# Patient Record
Sex: MALE | Race: OTHER | HISPANIC OR LATINO | URBAN - METROPOLITAN AREA
[De-identification: names, ages, dates, MRNs, and addresses within clinical notes are randomized per-mention and may not be internally consistent; named-entity substitution may affect disease eponyms.]

---

## 2023-08-18 ENCOUNTER — EMERGENCY (EMERGENCY)
Facility: HOSPITAL | Age: 58
LOS: 1 days | Discharge: HOME | End: 2023-08-18
Attending: EMERGENCY MEDICINE | Admitting: EMERGENCY MEDICINE
Payer: COMMERCIAL

## 2023-08-18 VITALS
TEMPERATURE: 99 F | HEIGHT: 73 IN | DIASTOLIC BLOOD PRESSURE: 63 MMHG | OXYGEN SATURATION: 99 % | WEIGHT: 285.06 LBS | HEART RATE: 99 BPM | SYSTOLIC BLOOD PRESSURE: 95 MMHG | RESPIRATION RATE: 16 BRPM

## 2023-08-18 VITALS — HEART RATE: 91 BPM | SYSTOLIC BLOOD PRESSURE: 121 MMHG | DIASTOLIC BLOOD PRESSURE: 83 MMHG

## 2023-08-18 LAB
ALBUMIN SERPL ELPH-MCNC: 3.9 G/DL — SIGNIFICANT CHANGE UP (ref 3.3–5)
ALP SERPL-CCNC: 90 U/L — SIGNIFICANT CHANGE UP (ref 40–120)
ALT FLD-CCNC: 34 U/L — SIGNIFICANT CHANGE UP (ref 12–78)
ANION GAP SERPL CALC-SCNC: 10 MMOL/L — SIGNIFICANT CHANGE UP (ref 5–17)
APPEARANCE UR: CLEAR — SIGNIFICANT CHANGE UP
AST SERPL-CCNC: 16 U/L — SIGNIFICANT CHANGE UP (ref 15–37)
BASOPHILS # BLD AUTO: 0.03 K/UL — SIGNIFICANT CHANGE UP (ref 0–0.2)
BASOPHILS NFR BLD AUTO: 0.3 % — SIGNIFICANT CHANGE UP (ref 0–2)
BILIRUB SERPL-MCNC: 0.5 MG/DL — SIGNIFICANT CHANGE UP (ref 0.2–1.2)
BILIRUB UR-MCNC: NEGATIVE — SIGNIFICANT CHANGE UP
BUN SERPL-MCNC: 20 MG/DL — SIGNIFICANT CHANGE UP (ref 7–23)
CALCIUM SERPL-MCNC: 9.4 MG/DL — SIGNIFICANT CHANGE UP (ref 8.5–10.1)
CHLORIDE SERPL-SCNC: 104 MMOL/L — SIGNIFICANT CHANGE UP (ref 96–108)
CO2 SERPL-SCNC: 23 MMOL/L — SIGNIFICANT CHANGE UP (ref 22–31)
COLOR SPEC: YELLOW — SIGNIFICANT CHANGE UP
CREAT SERPL-MCNC: 1.2 MG/DL — SIGNIFICANT CHANGE UP (ref 0.5–1.3)
DIFF PNL FLD: ABNORMAL
EGFR: 70 ML/MIN/1.73M2 — SIGNIFICANT CHANGE UP
EOSINOPHIL # BLD AUTO: 0.02 K/UL — SIGNIFICANT CHANGE UP (ref 0–0.5)
EOSINOPHIL NFR BLD AUTO: 0.2 % — SIGNIFICANT CHANGE UP (ref 0–6)
GLUCOSE SERPL-MCNC: 247 MG/DL — HIGH (ref 70–99)
GLUCOSE UR QL: >=1000 MG/DL
HCT VFR BLD CALC: 44.4 % — SIGNIFICANT CHANGE UP (ref 39–50)
HGB BLD-MCNC: 15 G/DL — SIGNIFICANT CHANGE UP (ref 13–17)
IMM GRANULOCYTES NFR BLD AUTO: 0.7 % — SIGNIFICANT CHANGE UP (ref 0–0.9)
KETONES UR-MCNC: ABNORMAL MG/DL
LACTATE SERPL-SCNC: 2 MMOL/L — SIGNIFICANT CHANGE UP (ref 0.7–2)
LEUKOCYTE ESTERASE UR-ACNC: NEGATIVE — SIGNIFICANT CHANGE UP
LIDOCAIN IGE QN: 48 U/L — LOW (ref 73–393)
LYMPHOCYTES # BLD AUTO: 0.75 K/UL — LOW (ref 1–3.3)
LYMPHOCYTES # BLD AUTO: 6.8 % — LOW (ref 13–44)
MCHC RBC-ENTMCNC: 29.2 PG — SIGNIFICANT CHANGE UP (ref 27–34)
MCHC RBC-ENTMCNC: 33.8 GM/DL — SIGNIFICANT CHANGE UP (ref 32–36)
MCV RBC AUTO: 86.4 FL — SIGNIFICANT CHANGE UP (ref 80–100)
MONOCYTES # BLD AUTO: 0.82 K/UL — SIGNIFICANT CHANGE UP (ref 0–0.9)
MONOCYTES NFR BLD AUTO: 7.4 % — SIGNIFICANT CHANGE UP (ref 2–14)
NEUTROPHILS # BLD AUTO: 9.33 K/UL — HIGH (ref 1.8–7.4)
NEUTROPHILS NFR BLD AUTO: 84.6 % — HIGH (ref 43–77)
NITRITE UR-MCNC: NEGATIVE — SIGNIFICANT CHANGE UP
NRBC # BLD: 0 /100 WBCS — SIGNIFICANT CHANGE UP (ref 0–0)
PH UR: 5.5 — SIGNIFICANT CHANGE UP (ref 5–8)
PLATELET # BLD AUTO: 284 K/UL — SIGNIFICANT CHANGE UP (ref 150–400)
POTASSIUM SERPL-MCNC: 3.8 MMOL/L — SIGNIFICANT CHANGE UP (ref 3.5–5.3)
POTASSIUM SERPL-SCNC: 3.8 MMOL/L — SIGNIFICANT CHANGE UP (ref 3.5–5.3)
PROT SERPL-MCNC: 7.8 G/DL — SIGNIFICANT CHANGE UP (ref 6–8.3)
PROT UR-MCNC: NEGATIVE MG/DL — SIGNIFICANT CHANGE UP
RAPID RVP RESULT: DETECTED
RBC # BLD: 5.14 M/UL — SIGNIFICANT CHANGE UP (ref 4.2–5.8)
RBC # FLD: 13.3 % — SIGNIFICANT CHANGE UP (ref 10.3–14.5)
RV+EV RNA SPEC QL NAA+PROBE: DETECTED
SARS-COV-2 RNA SPEC QL NAA+PROBE: SIGNIFICANT CHANGE UP
SODIUM SERPL-SCNC: 137 MMOL/L — SIGNIFICANT CHANGE UP (ref 135–145)
SP GR SPEC: 1.04 — HIGH (ref 1–1.03)
UROBILINOGEN FLD QL: 0.2 MG/DL — SIGNIFICANT CHANGE UP (ref 0.2–1)
WBC # BLD: 11.03 K/UL — HIGH (ref 3.8–10.5)
WBC # FLD AUTO: 11.03 K/UL — HIGH (ref 3.8–10.5)

## 2023-08-18 PROCEDURE — 96361 HYDRATE IV INFUSION ADD-ON: CPT

## 2023-08-18 PROCEDURE — 87086 URINE CULTURE/COLONY COUNT: CPT

## 2023-08-18 PROCEDURE — 83690 ASSAY OF LIPASE: CPT

## 2023-08-18 PROCEDURE — 87186 SC STD MICRODIL/AGAR DIL: CPT

## 2023-08-18 PROCEDURE — 96375 TX/PRO/DX INJ NEW DRUG ADDON: CPT

## 2023-08-18 PROCEDURE — 96376 TX/PRO/DX INJ SAME DRUG ADON: CPT

## 2023-08-18 PROCEDURE — 36415 COLL VENOUS BLD VENIPUNCTURE: CPT

## 2023-08-18 PROCEDURE — 71045 X-RAY EXAM CHEST 1 VIEW: CPT | Mod: 26,59

## 2023-08-18 PROCEDURE — 99285 EMERGENCY DEPT VISIT HI MDM: CPT

## 2023-08-18 PROCEDURE — 0225U NFCT DS DNA&RNA 21 SARSCOV2: CPT

## 2023-08-18 PROCEDURE — 83605 ASSAY OF LACTIC ACID: CPT

## 2023-08-18 PROCEDURE — 87150 DNA/RNA AMPLIFIED PROBE: CPT

## 2023-08-18 PROCEDURE — 99285 EMERGENCY DEPT VISIT HI MDM: CPT | Mod: 25

## 2023-08-18 PROCEDURE — 80053 COMPREHEN METABOLIC PANEL: CPT

## 2023-08-18 PROCEDURE — 71046 X-RAY EXAM CHEST 2 VIEWS: CPT

## 2023-08-18 PROCEDURE — 74177 CT ABD & PELVIS W/CONTRAST: CPT | Mod: 26,MA

## 2023-08-18 PROCEDURE — 74177 CT ABD & PELVIS W/CONTRAST: CPT | Mod: MA

## 2023-08-18 PROCEDURE — 81001 URINALYSIS AUTO W/SCOPE: CPT

## 2023-08-18 PROCEDURE — 96365 THER/PROPH/DIAG IV INF INIT: CPT | Mod: XU

## 2023-08-18 PROCEDURE — 85025 COMPLETE CBC W/AUTO DIFF WBC: CPT

## 2023-08-18 PROCEDURE — 71046 X-RAY EXAM CHEST 2 VIEWS: CPT | Mod: 26

## 2023-08-18 PROCEDURE — 87077 CULTURE AEROBIC IDENTIFY: CPT

## 2023-08-18 PROCEDURE — 87040 BLOOD CULTURE FOR BACTERIA: CPT

## 2023-08-18 PROCEDURE — 93005 ELECTROCARDIOGRAM TRACING: CPT

## 2023-08-18 PROCEDURE — 71045 X-RAY EXAM CHEST 1 VIEW: CPT

## 2023-08-18 PROCEDURE — 93010 ELECTROCARDIOGRAM REPORT: CPT

## 2023-08-18 RX ORDER — ONDANSETRON 8 MG/1
4 TABLET, FILM COATED ORAL ONCE
Refills: 0 | Status: COMPLETED | OUTPATIENT
Start: 2023-08-18 | End: 2023-08-18

## 2023-08-18 RX ORDER — DILTIAZEM HCL 120 MG
10 CAPSULE, EXT RELEASE 24 HR ORAL ONCE
Refills: 0 | Status: COMPLETED | OUTPATIENT
Start: 2023-08-18 | End: 2023-08-18

## 2023-08-18 RX ORDER — DILTIAZEM HCL 120 MG
10 CAPSULE, EXT RELEASE 24 HR ORAL
Qty: 125 | Refills: 0 | Status: DISCONTINUED | OUTPATIENT
Start: 2023-08-18 | End: 2023-08-18

## 2023-08-18 RX ORDER — SODIUM CHLORIDE 9 MG/ML
1000 INJECTION INTRAMUSCULAR; INTRAVENOUS; SUBCUTANEOUS ONCE
Refills: 0 | Status: COMPLETED | OUTPATIENT
Start: 2023-08-18 | End: 2023-08-18

## 2023-08-18 RX ORDER — METOPROLOL TARTRATE 50 MG
50 TABLET ORAL ONCE
Refills: 0 | Status: COMPLETED | OUTPATIENT
Start: 2023-08-18 | End: 2023-08-18

## 2023-08-18 RX ORDER — ACETAMINOPHEN 500 MG
1000 TABLET ORAL ONCE
Refills: 0 | Status: COMPLETED | OUTPATIENT
Start: 2023-08-18 | End: 2023-08-18

## 2023-08-18 RX ORDER — METOPROLOL TARTRATE 50 MG
5 TABLET ORAL ONCE
Refills: 0 | Status: COMPLETED | OUTPATIENT
Start: 2023-08-18 | End: 2023-08-18

## 2023-08-18 RX ORDER — CEFUROXIME AXETIL 250 MG
1 TABLET ORAL
Qty: 14 | Refills: 0
Start: 2023-08-18 | End: 2023-08-24

## 2023-08-18 RX ORDER — LACTOBACILLUS ACIDOPHILUS 100MM CELL
2 CAPSULE ORAL
Qty: 28 | Refills: 0
Start: 2023-08-18 | End: 2023-08-24

## 2023-08-18 RX ADMIN — SODIUM CHLORIDE 1000 MILLILITER(S): 9 INJECTION INTRAMUSCULAR; INTRAVENOUS; SUBCUTANEOUS at 06:46

## 2023-08-18 RX ADMIN — ONDANSETRON 4 MILLIGRAM(S): 8 TABLET, FILM COATED ORAL at 05:27

## 2023-08-18 RX ADMIN — Medication 400 MILLIGRAM(S): at 05:27

## 2023-08-18 RX ADMIN — Medication 10 MILLIGRAM(S): at 08:02

## 2023-08-18 RX ADMIN — Medication 10 MILLIGRAM(S): at 07:01

## 2023-08-18 RX ADMIN — Medication 1000 MILLIGRAM(S): at 06:03

## 2023-08-18 RX ADMIN — SODIUM CHLORIDE 1000 MILLILITER(S): 9 INJECTION INTRAMUSCULAR; INTRAVENOUS; SUBCUTANEOUS at 06:45

## 2023-08-18 RX ADMIN — SODIUM CHLORIDE 1000 MILLILITER(S): 9 INJECTION INTRAMUSCULAR; INTRAVENOUS; SUBCUTANEOUS at 05:27

## 2023-08-18 RX ADMIN — Medication 50 MILLIGRAM(S): at 10:55

## 2023-08-18 RX ADMIN — Medication 5 MILLIGRAM(S): at 10:54

## 2023-08-18 RX ADMIN — Medication 1000 MILLIGRAM(S): at 06:20

## 2023-08-18 RX ADMIN — Medication 10 MG/HR: at 08:14

## 2023-08-18 NOTE — ED PROVIDER NOTE - PATIENT PORTAL LINK FT
You can access the FollowMyHealth Patient Portal offered by [facility] by registering at the following website: [email or web address]. By joining [facility]’s FollowMyHealth portal, you will also be able to view your health information using other applications (apps) compatible with our system.

## 2023-08-18 NOTE — CONSULT NOTE ADULT - ASSESSMENT
- Patient is   - No clear evidence of acute ischemia, trops negative x 2. Will follow up third set.  - His CKs are flat, suggesting against acute atherosclerotic plaque rupture.  - Biomarker trend is not consistent with plaque rupture but rather demand ischemia. Monitor closely for the development of anginal symptoms or clinical signs of ischemia.   - No acute changes on EKG compared to previous.  - No meaningful evidence of volume overload.  - Previous TTE shows ___.  - BP well controlled, monitor routine hemodynamics.  - Continue ___.  - Monitor and replete lytes, keep K>4, Mg>2.  - Strict I/Os, daily weights.  - Pt has no active ischemia, decompensated heart failure, unstable arrythmia, or severe stenotic valvular disease, and has __ cardiac risk factors. In the setting of low risk ___, he/she is optimized from cardiovascular standpoint to proceed with planned procedure with routine hemodynamic monitoring.   - Pt has no modifiable active cardiac risk factors and in the setting of low risk _____, patient is optimized as best as possible from cardiovascular standpoint to proceed with planned procedure with routine hemodynamic monitoring.  - Other cardiovascular workup will depend on clinical course.  - All other workup per primary team.  - Will continue to follow.             Patient is 58-year-old male with PMHx of diabetes, hypertension, A-fib history of ablation presented with complaints of chills, rigors, urinary incontinence, and nausea that started approximately 10 PM last night.     - No clear evidence of acute ischemia.  - EKG showed NSR with bursts of Paroxysmal Atrial tachycardia,   - Patient was thought to have Afib in the ED and was given Cardizem 10mg IV X 2 and started on Cardizem drip.  - Discontinue the Cardizem drip  - Give IV Lopressor 5mg X 1  - Give Metoprolol tartrate 50mg PO now   - Increase home Metoprolol succinate to 50mg BID, recommend close followup with cardiologist in [place]    - No meaningful evidence of volume overload.  - Results of previous TTE not available.    - Patient presented with hypotension (95/63), BP improved (142/81) s/p 2L IVF in the ED, monitor routine hemodynamics.  - Increase home Metoprolol succinate to 50mg BID for HR control, recommend close followup with cardiologist in [place]  - Hold Olmesartan 40mg, until followup with cardiologist.    - Monitor and replete lytes, keep K>4, Mg>2.  - Other cardiovascular workup will depend on clinical course.  - All other workup per primary team.

## 2023-08-18 NOTE — CONSULT NOTE ADULT - ATTENDING COMMENTS
irregular heart rates in the setting of viral illness  has history of paf s/p ablation a few years ago, and initial suspicion for af  upon review of ekg and telemetry, appears to be sr/st with frequent apcs and bursts of pat  is asymptomatic overall, other than viral symptoms  dc cardizem gtt  to give lopressor 5 iv, then 50 po  he will increase his home bb dose to bid  if feeling well and hr better, no issue with dc home. He will fu with his cardiologist in [place]

## 2023-08-18 NOTE — ED ADULT NURSE NOTE - NSFALLUNIVINTERV_ED_ALL_ED
Bed/Stretcher in lowest position, wheels locked, appropriate side rails in place/Call bell, personal items and telephone in reach/Instruct patient to call for assistance before getting out of bed/chair/stretcher/Non-slip footwear applied when patient is off stretcher/[place] to call system/Physically safe environment - no spills, clutter or unnecessary equipment/Purposeful proactive rounding/Room/bathroom lighting operational, light cord in reach

## 2023-08-18 NOTE — ED PROVIDER NOTE - NSFOLLOWUPINSTRUCTIONS_ED_ALL_ED_FT
1. Follow-up with your Primary Medical Doctor . Call today  for prompt follow-up.  2. Follow-up with your cardiology as discussed. Call today / next business day for prompt follow-up and further workup and evaluation.  3. Return to Emergency room for any worsening or persistent pain, shortness of breath, weakness, fever, abdominal pain, dizziness, passing out, unexplained pain in arms, legs, back, any vomiting, feeling like your heart is racing,  or any other concerning symptoms.  4. See attached instruction sheets for additional information, including information regarding signs and symptoms to look out for, reasons to seek immediate care and other important instructions.  5.  Increase your metoprolol to 50 mg twice daily 1. Follow-up with your Primary Medical Doctor . Call today  for prompt follow-up.  2. Follow-up with your cardiology as discussed. Call today / next business day for prompt follow-up and further workup and evaluation.  3. Return to Emergency room for any worsening or persistent pain, shortness of breath, weakness, fever, abdominal pain, dizziness, passing out, unexplained pain in arms, legs, back, any vomiting, feeling like your heart is racing,  or any other concerning symptoms.  4. See attached instruction sheets for additional information, including information regarding signs and symptoms to look out for, reasons to seek immediate care and other important instructions.  5.  Increase your metoprolol to 50 mg twice daily  6.  Start Ceftin twice daily for 7 days 1. Follow-up with your Primary Medical Doctor . Call today  for prompt follow-up.  2. Follow-up with your cardiology as discussed. Call today / next business day for prompt follow-up and further workup and evaluation.  3. Return to Emergency room for any worsening or persistent pain, shortness of breath, weakness, fever, abdominal pain, dizziness, passing out, unexplained pain in arms, legs, back, any vomiting, feeling like your heart is racing,  or any other concerning symptoms.  4. See attached instruction sheets for additional information, including information regarding signs and symptoms to look out for, reasons to seek immediate care and other important instructions.  5.  Increase your metoprolol to 50 mg twice daily  6.  Bactrim twice daily for 7 days  7.  Lactobacillus twice daily for 7 days

## 2023-08-18 NOTE — ED PROVIDER NOTE - CONSTITUTIONAL APPEARANCE HYGIENE, MLM
well appearing [Time Spent: ___ minutes] : I have spent [unfilled] minutes of face to face time with the patient

## 2023-08-18 NOTE — CONSULT NOTE ADULT - SUBJECTIVE AND OBJECTIVE BOX
Patient is a 58y old  Male who presents with a chief complaint of     HPI:  58-year-old male with history of diabetes, hypertension, A-fib history of ablation presented with complaints of chills and rigors that started approximately 10 PM last night and nausea.  Patient states he is believes he has a urinary tract infection as he is having some urinary incontinence, urgency and frequency.  Denies hematuria.  Denies abdominal bloating back pain.  Denies diarrhea.    Interval Hx: Pt evaluated at bedside. He is alert, oriented, and denies any acute complaints. Patient had a hx of episodic Afib that would last about half a day and   Cardiologist in [place] (visiting [place] for business trip)    PAST MEDICAL & SURGICAL HISTORY:            ECHO  FINDINGS:      MEDICATIONS  (STANDING):  diltiazem Infusion 10 mG/Hr (10 mL/Hr) IV Continuous <Continuous>    MEDICATIONS  (PRN):      FAMILY HISTORY:    Denies Family history of CAD or early MI    ROS:  Constitutional: denies fever, chills  HEENT: denies blurry vision, difficulty hearing  Respiratory: denies SOB, SUTTON, cough  Cardiovascular: denies CP, palpitations, orthopnea, PND, LE edema  Gastrointestinal: denies nausea, vomiting, abdominal pain  Genitourinary: denies urinary changes  Skin: Denies rashes, itching  Neurologic: denies headache, weakness, dizziness  Hematology/Oncology: denies bleeding, easy bruising  ROS negative except as noted above      SOCIAL HISTORY:    No tobacco, Alcohol or Drug use    Vital Signs Last 24 Hrs  T(C): 36.9 (18 Aug 2023 07:55), Max: 37 (18 Aug 2023 04:25)  T(F): 98.4 (18 Aug 2023 07:55), Max: 98.6 (18 Aug 2023 04:25)  HR: 131 (18 Aug 2023 07:55) (99 - 133)  BP: 142/81 (18 Aug 2023 07:55) (95/63 - 142/81)  BP(mean): --  RR: 18 (18 Aug 2023 07:55) (16 - 18)  SpO2: 96% (18 Aug 2023 07:55) (96% - 99%)    Parameters below as of 18 Aug 2023 07:55  Patient On (Oxygen Delivery Method): room air        Physical Exam:  General: Well developed, well nourished, NAD  HEENT: NCAT, moist mucous membranes   Neurology: A&Ox3, nonfocal, sensation intact   Respiratory: CTA B/L, No W/R/R  CV: RRR, +S1/S2, no murmurs, rubs or gallops  Abdominal: Soft, NT, ND +BS, no palpable masses  Extremities: No LE edema or calf tenderness  MSK: Normal ROM, no joint erythema or warmth, no joint swelling   Heme: No obvious ecchymosis or petechiae   Skin: warm, dry, normal color      ECG:    I&O's Detail      LABS:                        15.0   11.03 )-----------( 284      ( 18 Aug 2023 05:30 )             44.4     08-18    137  |  104  |  20  ----------------------------<  247<H>  3.8   |  23  |  1.20    Ca    9.4      18 Aug 2023 05:30    TPro  7.8  /  Alb  3.9  /  TBili  0.5  /  DBili  x   /  AST  16  /  ALT  34  /  AlkPhos  90  08-18          Urinalysis Basic - ( 18 Aug 2023 06:13 )    Color: Yellow / Appearance: Clear / S.044 / pH: x  Gluc: x / Ketone: Trace mg/dL  / Bili: Negative / Urobili: 0.2 mg/dL   Blood: x / Protein: Negative mg/dL / Nitrite: Negative   Leuk Esterase: Negative / RBC: 5 /HPF / WBC 2 /HPF   Sq Epi: x / Non Sq Epi: x / Bacteria: x      I&O's Summary    BNP  RADIOLOGY & ADDITIONAL STUDIES: Patient is a 58y old  Male who presents with a chief complaint of     HPI:  58-year-old male with history of diabetes, hypertension, A-fib history of ablation presented with complaints of chills and rigors that started approximately 10 PM last night and nausea.  Patient states he is believes he has a urinary tract infection as he is having some urinary incontinence, urgency and frequency.  Denies hematuria.  Denies abdominal bloating back pain.  Denies diarrhea.    Interval Hx: Pt evaluated at bedside. He is alert, oriented, and denies any acute complaints. Patient is in [place] on a business trip from [place]. He felt some incontinence, chills, shaking, and dizziness last night but no CP, SOB, or palpitations. Patient had a hx of episodic Afib that would last about half a day and got a cardiac ablation done in 2017. After the procedure, the Afibs occurred less often and lasted for a shorter period of time. He had a full cardiac workup a year ago with normal results according to the patient and he is currently not on any blood thinners. Denies CP, SOB, Palpitations, dizziness.  Cardiologist in [place] (visiting [place] for business trip)    PAST MEDICAL & SURGICAL HISTORY:  Diabetes, Hypertension, Afib          ECHO  FINDINGS:      MEDICATIONS  (STANDING):  diltiazem Infusion 10 mG/Hr (10 mL/Hr) IV Continuous <Continuous>    MEDICATIONS  (PRN):      FAMILY HISTORY:    Mother had an MI at 41, DM, and BP  Father had a quadruple bypass at 87    ROS:  Constitutional: denies fever, chills  HEENT: denies blurry vision  Respiratory: denies SOB, SUTTON, cough  Cardiovascular: denies CP, palpitations, orthopnea, PND, LE edema  Gastrointestinal: denies nausea, vomiting, abdominal pain  Genitourinary: + incontinence, urgency and frequency  Neurologic: denies headache, weakness, dizziness  ROS negative except as noted above      SOCIAL HISTORY:    No tobacco or Drug use  Occasional alcohol use    Vital Signs Last 24 Hrs  T(C): 36.9 (18 Aug 2023 07:55), Max: 37 (18 Aug 2023 04:25)  T(F): 98.4 (18 Aug 2023 07:55), Max: 98.6 (18 Aug 2023 04:25)  HR: 131 (18 Aug 2023 07:55) (99 - 133)  BP: 142/81 (18 Aug 2023 07:55) (95/63 - 142/81)  BP(mean): --  RR: 18 (18 Aug 2023 07:55) (16 - 18)  SpO2: 96% (18 Aug 2023 07:55) (96% - 99%)    Parameters below as of 18 Aug 2023 07:55  Patient On (Oxygen Delivery Method): room air        Physical Exam:  General: Alert, oriented, NAD  HEENT: NCAT, moist mucous membranes   Neurology: A&Ox3, nonfocal, sensation intact   Respiratory: CTA B/L, No W/R/R  CV: RRR, +S1/S2, no murmurs, rubs or gallops  Abdominal: Soft, NT, ND, no palpable masses  Extremities: No LE edema or calf tenderness  MSK: Normal ROM, no joint erythema or warmth, no joint swelling   Heme: No obvious ecchymosis or petechiae   Skin: warm, dry, normal color      EC23 -     I&O's Detail      LABS:                        15.0   11.03 )-----------( 284      ( 18 Aug 2023 05:30 )             44.4         137  |  104  |  20  ----------------------------<  247<H>  3.8   |  23  |  1.20    Ca    9.4      18 Aug 2023 05:30    TPro  7.8  /  Alb  3.9  /  TBili  0.5  /  DBili  x   /  AST  16  /  ALT  34  /  AlkPhos  90  -          Urinalysis Basic - ( 18 Aug 2023 06:13 )    Color: Yellow / Appearance: Clear / S.044 / pH: x  Gluc: x / Ketone: Trace mg/dL  / Bili: Negative / Urobili: 0.2 mg/dL   Blood: x / Protein: Negative mg/dL / Nitrite: Negative   Leuk Esterase: Negative / RBC: 5 /HPF / WBC 2 /HPF   Sq Epi: x / Non Sq Epi: x / Bacteria: x      I&O's Summary    BNP  RADIOLOGY & ADDITIONAL STUDIES: Patient is a 58y old  Male who presents with a chief complaint of urinary incontinence, urgency, and frequency.     HPI:  58-year-old male with history of diabetes, hypertension, A-fib history of ablation presented with complaints of chills and rigors that started approximately 10 PM last night and nausea.  Patient states he is believes he has a urinary tract infection as he is having some urinary incontinence, urgency and frequency.  Denies hematuria.  Denies abdominal bloating back pain.  Denies diarrhea.    Interval Hx: Pt evaluated at bedside. He is alert, oriented, and denies any acute complaints. Patient is in [place] on a business trip from [place]. He felt some incontinence, chills, shaking, and dizziness last night but no CP, SOB, or palpitations. Patient had a hx of episodic Afib that would last about half a day and got a cardiac ablation done in 2017. After the procedure, the Afibs occurred less often and lasted for a shorter period of time. He had a full cardiac workup a year ago with normal results according to the patient and he is currently not on any blood thinners. Denies CP, SOB, Palpitations, dizziness.  Cardiologist in [place] (visiting [place] for business trip)    PAST MEDICAL & SURGICAL HISTORY:  Diabetes, Hypertension, Afib          ECHO  FINDINGS: Records not available       MEDICATIONS  (STANDING):  diltiazem Infusion 10 mG/Hr (10 mL/Hr) IV Continuous <Continuous>    MEDICATIONS  (PRN):      FAMILY HISTORY:    Mother had an MI at 41, DM, and BP  Father had a quadruple bypass at 87    ROS:  Constitutional: denies fever, chills  HEENT: denies blurry vision  Respiratory: denies SOB, SUTTON, cough  Cardiovascular: denies CP, palpitations, orthopnea, PND, LE edema  Gastrointestinal: denies nausea, vomiting, abdominal pain  Genitourinary: + incontinence, urgency and frequency  Neurologic: denies headache, weakness, dizziness  ROS negative except as noted above      SOCIAL HISTORY:    No tobacco or Drug use  Occasional alcohol use    Vital Signs Last 24 Hrs  T(C): 36.9 (18 Aug 2023 07:55), Max: 37 (18 Aug 2023 04:25)  T(F): 98.4 (18 Aug 2023 07:55), Max: 98.6 (18 Aug 2023 04:25)  HR: 131 (18 Aug 2023 07:55) (99 - 133)  BP: 142/81 (18 Aug 2023 07:55) (95/63 - 142/81)  BP(mean): --  RR: 18 (18 Aug 2023 07:55) (16 - 18)  SpO2: 96% (18 Aug 2023 07:55) (96% - 99%)    Parameters below as of 18 Aug 2023 07:55  Patient On (Oxygen Delivery Method): room air        Physical Exam:  General: Alert, oriented, NAD  HEENT: NCAT, moist mucous membranes   Neurology: A&Ox3, nonfocal, sensation intact   Respiratory: CTA B/L, No W/R/R  CV: RRR, +S1/S2, no murmurs, rubs or gallops  Abdominal: Soft, NT, ND, no palpable masses  Extremities: No LE edema or calf tenderness  MSK: Normal ROM, no joint erythema or warmth, no joint swelling   Heme: No obvious ecchymosis or petechiae   Skin: warm, dry, normal color      EC23 - NSR with bursts of Paroxysmal Atrial tachycardia,     I&O's Detail      LABS:                        15.0   11.03 )-----------( 284      ( 18 Aug 2023 05:30 )             44.4     08-18    137  |  104  |  20  ----------------------------<  247<H>  3.8   |  23  |  1.20    Ca    9.4      18 Aug 2023 05:30    TPro  7.8  /  Alb  3.9  /  TBili  0.5  /  DBili  x   /  AST  16  /  ALT  34  /  AlkPhos  90            Urinalysis Basic - ( 18 Aug 2023 06:13 )    Color: Yellow / Appearance: Clear / S.044 / pH: x  Gluc: x / Ketone: Trace mg/dL  / Bili: Negative / Urobili: 0.2 mg/dL   Blood: x / Protein: Negative mg/dL / Nitrite: Negative   Leuk Esterase: Negative / RBC: 5 /HPF / WBC 2 /HPF   Sq Epi: x / Non Sq Epi: x / Bacteria: x      I&O's Summary    BNP  RADIOLOGY & ADDITIONAL STUDIES:  < from: CT Abdomen and Pelvis w/ IV Cont (23 @ 08:31) >    ACC: [id number] EXAM:  CT ABDOMEN AND PELVIS IC   ORDERED BY:  TANVI HUGHES     PROCEDURE DATE:  2023          INTERPRETATION:  CLINICAL INFORMATION: Chills, urinary incontinence    COMPARISON: None.    CONTRAST/COMPLICATIONS:  IV Contrast: Omnipaque 350  90 cc administered   10 cc discarded  Oral Contrast: NONE  Complications: None reported at time of study completion    PROCEDURE:  CT of the Abdomen and Pelvis was performed.  Sagittal and coronal reformats were performed.    FINDINGS:  LOWER CHEST: Basilar atelectasis.    LIVER: Subcentimeter hypodensities, too small to further characterize.  BILE DUCTS: Normal caliber.  GALLBLADDER: Within normal limits.  SPLEEN: Within normal limits.  PANCREAS: Fatty replacement.  ADRENALS: Within normal limits.  KIDNEYS/URETERS: 2.2 cm right renal cyst. No hydronephrosis. Bilateral   symmetric enhancement.    BLADDER: Within normal limits.  REPRODUCTIVE ORGANS: Prostate is mildly enlarged.    BOWEL: Duodenal diverticulum. No bowel obstruction. Appendix surgically   absent.  PERITONEUM: No ascites.  VESSELS: Atherosclerotic changes.  RETROPERITONEUM/LYMPH NODES: No lymphadenopathy.  ABDOMINAL WALL: Postsurgical changes. Fat-containing left inguinal hernia.  BONES: Degenerative changes. Left iliac sclerotic focus, possibly bone   island.    IMPRESSION:  No hydronephrosis.        --- End of Report ---            TOMASA NAIK MD; Attending Radiologist  This document has been electronically signed. Aug 18 2023  8:43AM    < end of copied text >  < from: Xray Chest 2 Views PA/Lat (23 @ 09:51) >    ACC: [id number] EXAM:  XR CHEST PA LAT 2V   ORDERED BY: LATOSHA MEEKS     PROCEDURE DATE:  2023          INTERPRETATION:  EXAM: XR CHEST PA AND LATERAL    INDICATION: r/o pulmonary edema PXR    COMPARISON:  at 4:53 AM    IMPRESSION: No focal infiltrate or congestion. Heart is within normal   limits in its transthoracic diameter. Regional osseous structures   appropriate for age.    --- End of Report ---            RADHA SALGADO MD; Attending Radiologist  This document has been electronically signed. Aug 18 2023  9:57AM    < end of copied text >

## 2023-08-18 NOTE — ED PROVIDER NOTE - WR INTERPRETATION DATE TIME  1
18-Aug-2023 08:01 negative Regular rate & rhythm, normal S1, S2; no murmurs, gallops or rubs; no S3, S4

## 2023-08-18 NOTE — ED PROVIDER NOTE - OBJECTIVE STATEMENT
58-year-old male with history of diabetes, hypertension, A-fib history of ablation presented with complaints of chills and rigors that started approximately 10 PM last night and nausea.  Patient states he is believes he has a urinary tract infection as he is having some urinary incontinence, urgency and frequency.  Denies hematuria.  Denies abdominal bloating back pain.  Denies diarrhea.

## 2023-08-18 NOTE — ED ADULT TRIAGE NOTE - CHIEF COMPLAINT QUOTE
Patient brought by ambulance from [facility] was reported to having chills and having urinary incontinence; patient from [place]

## 2023-08-18 NOTE — ED ADULT NURSE REASSESSMENT NOTE - NS ED NURSE REASSESS COMMENT FT1
pt resting comfortably in stretcher at this time. remains in afib HR to 140s dr. Jaime aware - medicated as ordered and cardizem gtt initiated. to CT scan @ this time. care ongoing.

## 2023-08-18 NOTE — ED PROVIDER NOTE - PROGRESS NOTE DETAILS
Pt endorsed to Dr. Jaime to follow  HR 130s afib, cardizem 10mg IVP given with HR improved to 90s-100.   Pt states hes feeing better  CT abd/pelvis ordered to look for source of fever  IVFs in progress Patient with persistent rapid A-fib, patient states he is usually in normal sinus.  We will start Cardizem drip, will consult cardiology. Patient seen by Dr. Murrell, he will give metoprolol 50 now, as well as IV.  He states the patient can be discharged on metoprolol 50 mg twice daily, increasing from his once daily dose.  He will follow-up with his own cardiologist back in [place].  He states this is not atrial fibrillation, but rather sinus with PAT. Patient doing well, heart rate has now been stable in the 80s, and patient is otherwise asymptomatic.  Patient states he does have some persistent urinary symptoms, will start p.o. antibiotics for possible UTI.  Patient will otherwise follow-up with his primary care doctor in [place], will return with any worsening or persistent symptoms, or any other acute concerns or changes.

## 2023-08-18 NOTE — ED PROVIDER NOTE - CARE PLAN
1 Principal Discharge DX:	Upper respiratory infection  Secondary Diagnosis:	Tachycardia, unspecified

## 2023-08-18 NOTE — ED ADULT NURSE NOTE - CHIEF COMPLAINT QUOTE
Patient brought by ambulance from [facility] was reported to having chills and having urinary incontinence; patient from [place]

## 2023-08-19 LAB
CULTURE RESULTS: SIGNIFICANT CHANGE UP
SPECIMEN SOURCE: SIGNIFICANT CHANGE UP

## 2023-08-21 LAB
-  K. PNEUMONIAE GROUP: SIGNIFICANT CHANGE UP
GRAM STN FLD: SIGNIFICANT CHANGE UP
METHOD TYPE: SIGNIFICANT CHANGE UP

## 2023-08-21 NOTE — ED POST DISCHARGE NOTE - DETAILS
left voice mail for call back to the ED patient notified, he is back in [place], will go to local ER

## 2023-08-22 LAB
-  AMIKACIN: SIGNIFICANT CHANGE UP
-  AMPICILLIN/SULBACTAM: SIGNIFICANT CHANGE UP
-  AMPICILLIN: SIGNIFICANT CHANGE UP
-  AZTREONAM: SIGNIFICANT CHANGE UP
-  CEFAZOLIN: SIGNIFICANT CHANGE UP
-  CEFEPIME: SIGNIFICANT CHANGE UP
-  CEFOXITIN: SIGNIFICANT CHANGE UP
-  CEFTRIAXONE: SIGNIFICANT CHANGE UP
-  CIPROFLOXACIN: SIGNIFICANT CHANGE UP
-  ERTAPENEM: SIGNIFICANT CHANGE UP
-  GENTAMICIN: SIGNIFICANT CHANGE UP
-  IMIPENEM: SIGNIFICANT CHANGE UP
-  LEVOFLOXACIN: SIGNIFICANT CHANGE UP
-  MEROPENEM: SIGNIFICANT CHANGE UP
-  PIPERACILLIN/TAZOBACTAM: SIGNIFICANT CHANGE UP
-  TOBRAMYCIN: SIGNIFICANT CHANGE UP
-  TRIMETHOPRIM/SULFAMETHOXAZOLE: SIGNIFICANT CHANGE UP
CULTURE RESULTS: SIGNIFICANT CHANGE UP
METHOD TYPE: SIGNIFICANT CHANGE UP
ORGANISM # SPEC MICROSCOPIC CNT: SIGNIFICANT CHANGE UP
SPECIMEN SOURCE: SIGNIFICANT CHANGE UP

## 2023-08-23 LAB
CULTURE RESULTS: SIGNIFICANT CHANGE UP
SPECIMEN SOURCE: SIGNIFICANT CHANGE UP
